# Patient Record
Sex: FEMALE | Race: WHITE | NOT HISPANIC OR LATINO | Employment: FULL TIME | URBAN - METROPOLITAN AREA
[De-identification: names, ages, dates, MRNs, and addresses within clinical notes are randomized per-mention and may not be internally consistent; named-entity substitution may affect disease eponyms.]

---

## 2019-04-04 ENCOUNTER — HOSPITAL ENCOUNTER (EMERGENCY)
Facility: MEDICAL CENTER | Age: 39
End: 2019-04-04
Attending: EMERGENCY MEDICINE
Payer: COMMERCIAL

## 2019-04-04 VITALS
SYSTOLIC BLOOD PRESSURE: 127 MMHG | DIASTOLIC BLOOD PRESSURE: 82 MMHG | TEMPERATURE: 98.9 F | RESPIRATION RATE: 18 BRPM | OXYGEN SATURATION: 98 % | WEIGHT: 127.87 LBS | HEART RATE: 103 BPM | BODY MASS INDEX: 19.44 KG/M2

## 2019-04-04 DIAGNOSIS — Z00.8 MEDICAL CLEARANCE FOR PSYCHIATRIC ADMISSION: ICD-10-CM

## 2019-04-04 DIAGNOSIS — S01.81XA FACIAL LACERATION, INITIAL ENCOUNTER: ICD-10-CM

## 2019-04-04 DIAGNOSIS — F10.20 ALCOHOLISM (HCC): ICD-10-CM

## 2019-04-04 LAB — POC BREATHALIZER: 0.05 PERCENT (ref 0–0.01)

## 2019-04-04 PROCEDURE — 302970 POC BREATHALIZER

## 2019-04-04 PROCEDURE — 90715 TDAP VACCINE 7 YRS/> IM: CPT

## 2019-04-04 PROCEDURE — 90471 IMMUNIZATION ADMIN: CPT

## 2019-04-04 PROCEDURE — 700111 HCHG RX REV CODE 636 W/ 250 OVERRIDE (IP)

## 2019-04-04 PROCEDURE — 700102 HCHG RX REV CODE 250 W/ 637 OVERRIDE(OP): Performed by: EMERGENCY MEDICINE

## 2019-04-04 PROCEDURE — 302970 POC BREATHALIZER: Performed by: EMERGENCY MEDICINE

## 2019-04-04 PROCEDURE — A9270 NON-COVERED ITEM OR SERVICE: HCPCS | Performed by: EMERGENCY MEDICINE

## 2019-04-04 PROCEDURE — 99284 EMERGENCY DEPT VISIT MOD MDM: CPT

## 2019-04-04 RX ORDER — LORAZEPAM 1 MG/1
1 TABLET ORAL ONCE
Status: COMPLETED | OUTPATIENT
Start: 2019-04-04 | End: 2019-04-04

## 2019-04-04 RX ADMIN — LORAZEPAM 1 MG: 1 TABLET ORAL at 22:12

## 2019-04-04 RX ADMIN — CLOSTRIDIUM TETANI TOXOID ANTIGEN (FORMALDEHYDE INACTIVATED), CORYNEBACTERIUM DIPHTHERIAE TOXOID ANTIGEN (FORMALDEHYDE INACTIVATED), BORDETELLA PERTUSSIS TOXOID ANTIGEN (GLUTARALDEHYDE INACTIVATED), BORDETELLA PERTUSSIS FILAMENTOUS HEMAGGLUTININ ANTIGEN (FORMALDEHYDE INACTIVATED), BORDETELLA PERTUSSIS PERTACTIN ANTIGEN, AND BORDETELLA PERTUSSIS FIMBRIAE 2/3 ANTIGEN 0.5 ML: 5; 2; 2.5; 5; 3; 5 INJECTION, SUSPENSION INTRAMUSCULAR at 22:13
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
## 2019-04-05 NOTE — ED NOTES
Pt had no questions or concerns .gait steady to lobby. Pt taking taxi to [facility], report given to Joana at [facility]

## 2019-05-06 ENCOUNTER — APPOINTMENT (OUTPATIENT)
Dept: RADIOLOGY | Facility: MEDICAL CENTER | Age: 39
End: 2019-05-06
Attending: EMERGENCY MEDICINE
Payer: COMMERCIAL

## 2019-05-06 ENCOUNTER — HOSPITAL ENCOUNTER (EMERGENCY)
Facility: MEDICAL CENTER | Age: 39
End: 2019-05-06
Attending: EMERGENCY MEDICINE
Payer: COMMERCIAL

## 2019-05-06 VITALS
BODY MASS INDEX: 18.38 KG/M2 | TEMPERATURE: 99.9 F | DIASTOLIC BLOOD PRESSURE: 86 MMHG | SYSTOLIC BLOOD PRESSURE: 122 MMHG | OXYGEN SATURATION: 95 % | HEIGHT: 68 IN | RESPIRATION RATE: 16 BRPM | HEART RATE: 103 BPM | WEIGHT: 121.25 LBS

## 2019-05-06 DIAGNOSIS — N39.0 ACUTE UTI: ICD-10-CM

## 2019-05-06 DIAGNOSIS — F19.10 POLYSUBSTANCE ABUSE (HCC): ICD-10-CM

## 2019-05-06 DIAGNOSIS — L03.119 ANKLE CELLULITIS: ICD-10-CM

## 2019-05-06 LAB
ALBUMIN SERPL BCP-MCNC: 3.4 G/DL (ref 3.2–4.9)
ALBUMIN/GLOB SERPL: 0.9 G/DL
ALP SERPL-CCNC: 112 U/L (ref 30–99)
ALT SERPL-CCNC: 30 U/L (ref 2–50)
AMPHET UR QL SCN: POSITIVE
ANION GAP SERPL CALC-SCNC: 11 MMOL/L (ref 0–11.9)
ANISOCYTOSIS BLD QL SMEAR: ABNORMAL
APPEARANCE UR: ABNORMAL
AST SERPL-CCNC: 46 U/L (ref 12–45)
BACTERIA #/AREA URNS HPF: ABNORMAL /HPF
BARBITURATES UR QL SCN: NEGATIVE
BASOPHILS # BLD AUTO: 0.9 % (ref 0–1.8)
BASOPHILS # BLD: 0.08 K/UL (ref 0–0.12)
BENZODIAZ UR QL SCN: POSITIVE
BILIRUB SERPL-MCNC: 0.5 MG/DL (ref 0.1–1.5)
BILIRUB UR QL STRIP.AUTO: NEGATIVE
BUN SERPL-MCNC: 8 MG/DL (ref 8–22)
BZE UR QL SCN: NEGATIVE
CALCIUM SERPL-MCNC: 9.4 MG/DL (ref 8.5–10.5)
CANNABINOIDS UR QL SCN: NEGATIVE
CHLORIDE SERPL-SCNC: 101 MMOL/L (ref 96–112)
CO2 SERPL-SCNC: 23 MMOL/L (ref 20–33)
COLOR UR: YELLOW
CREAT SERPL-MCNC: 0.69 MG/DL (ref 0.5–1.4)
CRP SERPL HS-MCNC: 12.04 MG/DL (ref 0–0.75)
EKG IMPRESSION: NORMAL
EOSINOPHIL # BLD AUTO: 0.08 K/UL (ref 0–0.51)
EOSINOPHIL NFR BLD: 0.9 % (ref 0–6.9)
EPI CELLS #/AREA URNS HPF: ABNORMAL /HPF
ERYTHROCYTE [DISTWIDTH] IN BLOOD BY AUTOMATED COUNT: 60.9 FL (ref 35.9–50)
ERYTHROCYTE [SEDIMENTATION RATE] IN BLOOD BY WESTERGREN METHOD: 70 MM/HOUR (ref 0–20)
ETHANOL BLD-MCNC: 0 G/DL
GIANT PLATELETS BLD QL SMEAR: NORMAL
GLOBULIN SER CALC-MCNC: 3.9 G/DL (ref 1.9–3.5)
GLUCOSE SERPL-MCNC: 84 MG/DL (ref 65–99)
GLUCOSE UR STRIP.AUTO-MCNC: NEGATIVE MG/DL
HCT VFR BLD AUTO: 41.3 % (ref 37–47)
HGB BLD-MCNC: 11.8 G/DL (ref 12–16)
KETONES UR STRIP.AUTO-MCNC: NEGATIVE MG/DL
LACTATE BLD-SCNC: 1.2 MMOL/L (ref 0.5–2)
LACTATE BLD-SCNC: 1.8 MMOL/L (ref 0.5–2)
LEUKOCYTE ESTERASE UR QL STRIP.AUTO: ABNORMAL
LYMPHOCYTES # BLD AUTO: 0.86 K/UL (ref 1–4.8)
LYMPHOCYTES NFR BLD: 9.9 % (ref 22–41)
MACROCYTES BLD QL SMEAR: ABNORMAL
MANUAL DIFF BLD: NORMAL
MCH RBC QN AUTO: 21 PG (ref 27–33)
MCHC RBC AUTO-ENTMCNC: 28.6 G/DL (ref 33.6–35)
MCV RBC AUTO: 73.4 FL (ref 81.4–97.8)
METHADONE UR QL SCN: NEGATIVE
MICRO URNS: ABNORMAL
MICROCYTES BLD QL SMEAR: ABNORMAL
MONOCYTES # BLD AUTO: 0.08 K/UL (ref 0–0.85)
MONOCYTES NFR BLD AUTO: 0.9 % (ref 0–13.4)
MORPHOLOGY BLD-IMP: NORMAL
MYELOCYTES NFR BLD MANUAL: 0.9 %
NEUTROPHILS # BLD AUTO: 7.53 K/UL (ref 2–7.15)
NEUTROPHILS NFR BLD: 84.7 % (ref 44–72)
NEUTS BAND NFR BLD MANUAL: 1.8 % (ref 0–10)
NITRITE UR QL STRIP.AUTO: POSITIVE
NRBC # BLD AUTO: 0 K/UL
NRBC BLD-RTO: 0 /100 WBC
OPIATES UR QL SCN: POSITIVE
OVALOCYTES BLD QL SMEAR: NORMAL
OXYCODONE UR QL SCN: NEGATIVE
PCP UR QL SCN: NEGATIVE
PH UR STRIP.AUTO: 7 [PH]
PLATELET # BLD AUTO: 300 K/UL (ref 164–446)
PLATELET BLD QL SMEAR: NORMAL
PMV BLD AUTO: 8.8 FL (ref 9–12.9)
POIKILOCYTOSIS BLD QL SMEAR: NORMAL
POTASSIUM SERPL-SCNC: 3.4 MMOL/L (ref 3.6–5.5)
PROPOXYPH UR QL SCN: NEGATIVE
PROT SERPL-MCNC: 7.3 G/DL (ref 6–8.2)
PROT UR QL STRIP: NEGATIVE MG/DL
RBC # BLD AUTO: 5.63 M/UL (ref 4.2–5.4)
RBC # URNS HPF: ABNORMAL /HPF
RBC BLD AUTO: PRESENT
RBC UR QL AUTO: ABNORMAL
SCHISTOCYTES BLD QL SMEAR: NORMAL
SMUDGE CELLS BLD QL SMEAR: NORMAL
SODIUM SERPL-SCNC: 135 MMOL/L (ref 135–145)
SP GR UR STRIP.AUTO: 1.01
UROBILINOGEN UR STRIP.AUTO-MCNC: 1 MG/DL
WBC # BLD AUTO: 8.7 K/UL (ref 4.8–10.8)
WBC #/AREA URNS HPF: ABNORMAL /HPF

## 2019-05-06 PROCEDURE — 85652 RBC SED RATE AUTOMATED: CPT

## 2019-05-06 PROCEDURE — 87040 BLOOD CULTURE FOR BACTERIA: CPT | Mod: 91

## 2019-05-06 PROCEDURE — 700111 HCHG RX REV CODE 636 W/ 250 OVERRIDE (IP): Performed by: EMERGENCY MEDICINE

## 2019-05-06 PROCEDURE — 93005 ELECTROCARDIOGRAM TRACING: CPT | Performed by: EMERGENCY MEDICINE

## 2019-05-06 PROCEDURE — 80053 COMPREHEN METABOLIC PANEL: CPT

## 2019-05-06 PROCEDURE — 85027 COMPLETE CBC AUTOMATED: CPT

## 2019-05-06 PROCEDURE — 85007 BL SMEAR W/DIFF WBC COUNT: CPT

## 2019-05-06 PROCEDURE — 70450 CT HEAD/BRAIN W/O DYE: CPT

## 2019-05-06 PROCEDURE — 71045 X-RAY EXAM CHEST 1 VIEW: CPT

## 2019-05-06 PROCEDURE — 700105 HCHG RX REV CODE 258: Performed by: EMERGENCY MEDICINE

## 2019-05-06 PROCEDURE — 73610 X-RAY EXAM OF ANKLE: CPT | Mod: LT

## 2019-05-06 PROCEDURE — 87086 URINE CULTURE/COLONY COUNT: CPT

## 2019-05-06 PROCEDURE — 80307 DRUG TEST PRSMV CHEM ANLYZR: CPT

## 2019-05-06 PROCEDURE — 36415 COLL VENOUS BLD VENIPUNCTURE: CPT

## 2019-05-06 PROCEDURE — 87186 SC STD MICRODIL/AGAR DIL: CPT

## 2019-05-06 PROCEDURE — 99285 EMERGENCY DEPT VISIT HI MDM: CPT

## 2019-05-06 PROCEDURE — 86140 C-REACTIVE PROTEIN: CPT

## 2019-05-06 PROCEDURE — 87077 CULTURE AEROBIC IDENTIFY: CPT

## 2019-05-06 PROCEDURE — 83605 ASSAY OF LACTIC ACID: CPT

## 2019-05-06 PROCEDURE — 81001 URINALYSIS AUTO W/SCOPE: CPT | Mod: XU

## 2019-05-06 PROCEDURE — 96365 THER/PROPH/DIAG IV INF INIT: CPT

## 2019-05-06 RX ORDER — DIPHENHYDRAMINE HYDROCHLORIDE 50 MG/ML
25 INJECTION INTRAMUSCULAR; INTRAVENOUS ONCE
Status: DISCONTINUED | OUTPATIENT
Start: 2019-05-06 | End: 2019-05-06

## 2019-05-06 RX ORDER — SODIUM CHLORIDE 9 MG/ML
30 INJECTION, SOLUTION INTRAVENOUS ONCE
Status: COMPLETED | OUTPATIENT
Start: 2019-05-06 | End: 2019-05-06

## 2019-05-06 RX ORDER — SULFAMETHOXAZOLE AND TRIMETHOPRIM 800; 160 MG/1; MG/1
1 TABLET ORAL 2 TIMES DAILY
Qty: 20 TAB | Refills: 0 | Status: SHIPPED | OUTPATIENT
Start: 2019-05-06 | End: 2019-05-16

## 2019-05-06 RX ORDER — SODIUM CHLORIDE 9 MG/ML
1000 INJECTION, SOLUTION INTRAVENOUS
Status: DISCONTINUED | OUTPATIENT
Start: 2019-05-06 | End: 2019-05-07 | Stop reason: HOSPADM

## 2019-05-06 RX ORDER — CEPHALEXIN 500 MG/1
500 CAPSULE ORAL 3 TIMES DAILY
Qty: 21 CAP | Refills: 0 | Status: SHIPPED | OUTPATIENT
Start: 2019-05-06 | End: 2019-05-13

## 2019-05-06 RX ADMIN — AMPICILLIN AND SULBACTAM 3 G: 2; 1 INJECTION, POWDER, FOR SOLUTION INTRAVENOUS at 20:39

## 2019-05-06 RX ADMIN — SODIUM CHLORIDE 1650 ML: 9 INJECTION, SOLUTION INTRAVENOUS at 20:15

## 2019-05-06 NOTE — LETTER
5/8/2019               Leah Kulwinder Carlos  [address] [address]        Dear Leah (MR#[id number])    As we have been unable to contact you by phone, this letter is sent in regards to your recent visit to the [facility] Emergency Department on 5/6/2019.  During the visit, tests were performed to assist the physician in a medical diagnosis.  A review of those tests requires that we notify you of the following:    Your urine culture and sensitivity that was POSITIVE for a bacteria called Escherichia coli, and further treatment may be necessary. The currently prescribed antibiotic (sulfamethoxazole-trimethoprim and cephalexin) may not be effective in treating your infection. IF YOU ARE NOT FEELING BETTER PLEASE CONTACT ME AS SOON AS POSSIBLE AT THE NUMBER BELOW.       Because of the above findings, we advise that you see your private physician or you may return to the Emergency Department for additional treatment.      Please feel free to contact me at the number below if you have any questions or concerns. Thank you for your cooperation in the matter.    Sincerely,  ED Culture Follow-Up Staff  Veronica Handy, PharmD    [facility], Emergency Department  [address], [place] [address]  [phone]

## 2019-05-07 NOTE — ED NOTES
IV placed, attempted blood draw, able to get one green top and first set of blood cultures,  called to draw second set and remaining labs.
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
## 2019-05-07 NOTE — DISCHARGE PLANNING
Medical Social Work    Referral: Return to [facility] Voluntarily    Intervention: MSW received a call from bedside RN that pt is from [facility] for medical clearance and is ready to return.  MSW contacted Geoffrey at [facility] who verified pt is there for detox voluntarily.  Per Geoffrey they do have the two antibiotics that pt was prescribed at their pharmacy.  Per Geoffrey pt can return to them; receiving doctor is Dr. Gonzalez.  MSW provided bedside RN with cab voucher (# [phone]) for pt to get back to [facility] safely.    Plan: Pt to D/C back to [facility] via cab.

## 2019-05-07 NOTE — ED NOTES
Attempted to straight cath patient per order, patient immediately woke up and was a0x4, and ambulatory to the bathroom with steady gait

## 2019-05-07 NOTE — ED TRIAGE NOTES
"Pt bib ems.  Chief Complaint   Patient presents with   • Detox     sent from [facility]   • Ankle Pain     left     Pt states she wants to detox, checked into [facility] this am. \"They sent me here for my ankle. They still have all my stuff!\"   "

## 2019-05-08 LAB
BACTERIA UR CULT: ABNORMAL
BACTERIA UR CULT: ABNORMAL
SIGNIFICANT IND 70042: ABNORMAL
SITE SITE: ABNORMAL
SOURCE SOURCE: ABNORMAL

## 2019-05-12 LAB
BACTERIA BLD CULT: NORMAL
BACTERIA BLD CULT: NORMAL
SIGNIFICANT IND 70042: NORMAL
SIGNIFICANT IND 70042: NORMAL
SITE SITE: NORMAL
SITE SITE: NORMAL
SOURCE SOURCE: NORMAL
SOURCE SOURCE: NORMAL

## 2020-02-14 ENCOUNTER — HOSPITAL ENCOUNTER (EMERGENCY)
Dept: HOSPITAL 8 - ED | Age: 40
LOS: 1 days | Discharge: HOME | End: 2020-02-15
Payer: MEDICAID

## 2020-02-14 VITALS — HEIGHT: 68 IN | BODY MASS INDEX: 20.11 KG/M2 | WEIGHT: 132.72 LBS

## 2020-02-14 DIAGNOSIS — F17.200: ICD-10-CM

## 2020-02-14 DIAGNOSIS — N20.1: Primary | ICD-10-CM

## 2020-02-14 DIAGNOSIS — N23: ICD-10-CM

## 2020-02-14 DIAGNOSIS — G40.909: ICD-10-CM

## 2020-02-14 LAB
<PLATELET ESTIMATE>: ADEQUATE
<PLT MORPHOLOGY>: (no result)
ALBUMIN SERPL-MCNC: 3.7 G/DL (ref 3.4–5)
ALP SERPL-CCNC: 57 U/L (ref 45–117)
ALT SERPL-CCNC: 17 U/L (ref 12–78)
ANION GAP SERPL CALC-SCNC: 8 MMOL/L (ref 5–15)
ANISOCYTOSIS BLD QL SMEAR: (no result)
BASOPHILS # BLD AUTO: 0.01 X10^3/UL (ref 0–0.1)
BASOPHILS NFR BLD AUTO: 0 % (ref 0–1)
BILIRUB SERPL-MCNC: 0.3 MG/DL (ref 0.2–1)
CALCIUM SERPL-MCNC: 8.6 MG/DL (ref 8.5–10.1)
CHLORIDE SERPL-SCNC: 108 MMOL/L (ref 98–107)
CREAT SERPL-MCNC: 1.06 MG/DL (ref 0.55–1.02)
CULTURE INDICATED?: NO
EOSINOPHIL # BLD AUTO: 0.07 X10^3/UL (ref 0–0.4)
EOSINOPHIL NFR BLD AUTO: 1 % (ref 1–7)
ERYTHROCYTE [DISTWIDTH] IN BLOOD BY AUTOMATED COUNT: 19.6 % (ref 9.6–15.2)
HYPOCHROMIA BLD QL SMEAR: (no result)
LYMPHOCYTES # BLD AUTO: 1.08 X10^3/UL (ref 1–3.4)
LYMPHOCYTES NFR BLD AUTO: 19 % (ref 22–44)
MCH RBC QN AUTO: 20.5 PG (ref 27–34.8)
MCHC RBC AUTO-ENTMCNC: 30.2 G/DL (ref 32.4–35.8)
MCV RBC AUTO: 67.9 FL (ref 80–100)
MD: (no result)
MICROCYTES BLD QL SMEAR: (no result)
MICROSCOPIC: (no result)
MONOCYTES # BLD AUTO: 0.65 X10^3/UL (ref 0.2–0.8)
MONOCYTES NFR BLD AUTO: 11 % (ref 2–9)
NEUTROPHILS # BLD AUTO: 4 X10^3/UL (ref 1.8–6.8)
NEUTROPHILS NFR BLD AUTO: 69 % (ref 42–75)
OVALOCYTES BLD QL SMEAR: (no result)
PLATELET # BLD AUTO: 347 X10^3/UL (ref 130–400)
PMV BLD AUTO: 7.7 FL (ref 7.4–10.4)
PROT SERPL-MCNC: 7.3 G/DL (ref 6.4–8.2)
RBC # BLD AUTO: 4.93 X10^6/UL (ref 3.82–5.3)

## 2020-02-14 PROCEDURE — 74176 CT ABD & PELVIS W/O CONTRAST: CPT

## 2020-02-14 PROCEDURE — 99284 EMERGENCY DEPT VISIT MOD MDM: CPT

## 2020-02-14 PROCEDURE — 96374 THER/PROPH/DIAG INJ IV PUSH: CPT

## 2020-02-14 PROCEDURE — 83690 ASSAY OF LIPASE: CPT

## 2020-02-14 PROCEDURE — 81001 URINALYSIS AUTO W/SCOPE: CPT

## 2020-02-14 PROCEDURE — 96375 TX/PRO/DX INJ NEW DRUG ADDON: CPT

## 2020-02-14 PROCEDURE — 80053 COMPREHEN METABOLIC PANEL: CPT

## 2020-02-14 PROCEDURE — 85025 COMPLETE CBC W/AUTO DIFF WBC: CPT

## 2020-02-14 PROCEDURE — 36415 COLL VENOUS BLD VENIPUNCTURE: CPT

## 2020-02-14 PROCEDURE — 84703 CHORIONIC GONADOTROPIN ASSAY: CPT

## 2020-02-14 NOTE — NUR
Pt returned from CT. VS retaken. Pt on pulse ox/HR monitor. Call light within 
reach. Warm blanket provided.

## 2020-02-14 NOTE — NUR
Pt alert and sitting up on gurney. Pt reports left sided back pain, nausea and 
vomiting since noon. No fevers or diarrhea. Pt reprots hx of kidney stones. MD 
at bedside. Lab at bedside for draw. Pt in gown. Call light within reach.

## 2020-02-15 VITALS — DIASTOLIC BLOOD PRESSURE: 74 MMHG | SYSTOLIC BLOOD PRESSURE: 124 MMHG

## 2020-02-15 NOTE — NUR
Pt alert sitting up on gurney. Pt reports improvement after med admin. VS 
retaken. Pt aware of wait for CT results. No needs at this time.

## 2020-02-23 ENCOUNTER — HOSPITAL ENCOUNTER (EMERGENCY)
Dept: HOSPITAL 8 - ED | Age: 40
Discharge: HOME | End: 2020-02-23
Payer: MEDICAID

## 2020-02-23 VITALS — HEIGHT: 68 IN | BODY MASS INDEX: 21.15 KG/M2 | WEIGHT: 139.55 LBS

## 2020-02-23 VITALS — SYSTOLIC BLOOD PRESSURE: 111 MMHG | DIASTOLIC BLOOD PRESSURE: 71 MMHG

## 2020-02-23 DIAGNOSIS — N30.00: Primary | ICD-10-CM

## 2020-02-23 LAB
ALBUMIN SERPL-MCNC: 3.6 G/DL (ref 3.4–5)
ALP SERPL-CCNC: 54 U/L (ref 45–117)
ALT SERPL-CCNC: 20 U/L (ref 12–78)
ANION GAP SERPL CALC-SCNC: 5 MMOL/L (ref 5–15)
BASOPHILS # BLD AUTO: 0.03 X10^3/UL (ref 0–0.1)
BASOPHILS NFR BLD AUTO: 0 % (ref 0–1)
BILIRUB SERPL-MCNC: 0.3 MG/DL (ref 0.2–1)
CALCIUM SERPL-MCNC: 7.8 MG/DL (ref 8.5–10.1)
CHLORIDE SERPL-SCNC: 110 MMOL/L (ref 98–107)
CREAT SERPL-MCNC: 0.87 MG/DL (ref 0.55–1.02)
CULTURE INDICATED?: YES
EOSINOPHIL # BLD AUTO: 0.24 X10^3/UL (ref 0–0.4)
EOSINOPHIL NFR BLD AUTO: 3 % (ref 1–7)
ERYTHROCYTE [DISTWIDTH] IN BLOOD BY AUTOMATED COUNT: 19.1 % (ref 9.6–15.2)
LYMPHOCYTES # BLD AUTO: 1.47 X10^3/UL (ref 1–3.4)
LYMPHOCYTES NFR BLD AUTO: 15 % (ref 22–44)
MCH RBC QN AUTO: 20.8 PG (ref 27–34.8)
MCHC RBC AUTO-ENTMCNC: 31 G/DL (ref 32.4–35.8)
MCV RBC AUTO: 67 FL (ref 80–100)
MD: NO
MICROSCOPIC: (no result)
MONOCYTES # BLD AUTO: 0.71 X10^3/UL (ref 0.2–0.8)
MONOCYTES NFR BLD AUTO: 7 % (ref 2–9)
NEUTROPHILS # BLD AUTO: 7.26 X10^3/UL (ref 1.8–6.8)
NEUTROPHILS NFR BLD AUTO: 75 % (ref 42–75)
PLATELET # BLD AUTO: 486 X10^3/UL (ref 130–400)
PMV BLD AUTO: 7.3 FL (ref 7.4–10.4)
PROT SERPL-MCNC: 7 G/DL (ref 6.4–8.2)
RBC # BLD AUTO: 5.12 X10^6/UL (ref 3.82–5.3)

## 2020-02-23 PROCEDURE — 96374 THER/PROPH/DIAG INJ IV PUSH: CPT

## 2020-02-23 PROCEDURE — 96375 TX/PRO/DX INJ NEW DRUG ADDON: CPT

## 2020-02-23 PROCEDURE — 85025 COMPLETE CBC W/AUTO DIFF WBC: CPT

## 2020-02-23 PROCEDURE — 81001 URINALYSIS AUTO W/SCOPE: CPT

## 2020-02-23 PROCEDURE — 83690 ASSAY OF LIPASE: CPT

## 2020-02-23 PROCEDURE — 87086 URINE CULTURE/COLONY COUNT: CPT

## 2020-02-23 PROCEDURE — 80053 COMPREHEN METABOLIC PANEL: CPT

## 2020-02-23 PROCEDURE — 76770 US EXAM ABDO BACK WALL COMP: CPT

## 2020-02-23 PROCEDURE — 99284 EMERGENCY DEPT VISIT MOD MDM: CPT

## 2020-02-23 PROCEDURE — 84703 CHORIONIC GONADOTROPIN ASSAY: CPT

## 2020-02-23 PROCEDURE — 87147 CULTURE TYPE IMMUNOLOGIC: CPT

## 2020-02-23 PROCEDURE — 36415 COLL VENOUS BLD VENIPUNCTURE: CPT

## 2020-02-23 NOTE — NUR
PT PRESENTED WITH CO KIDNEY STONES FOR 9 DAYS, "I WAS HERE 9 DAYS AGO, THE PAIN 
HAS GOTTEN WORSE", B/L LOWER ABD AND FLANK PAIN, + NAUSEA. MONITORS APPLIED, 
PROVIDED PT WITH WARM BLNKET, SIDERAILS UP X2, CALL LIGHT WITHIN REACH. URINE 
SAMPLE SENT TO LAB. AWAITING ERP EVAL AND ORDERS

## 2020-07-24 PROBLEM — F31.9 BIPOLAR AFFECTIVE (HCC): Status: ACTIVE | Noted: 2020-07-24

## 2020-09-20 PROBLEM — K92.1 MELENA: Status: ACTIVE | Noted: 2020-09-20

## 2020-09-20 PROBLEM — K85.20 ALCOHOL-INDUCED ACUTE PANCREATITIS WITHOUT INFECTION OR NECROSIS: Status: ACTIVE | Noted: 2020-09-20

## 2020-09-20 PROBLEM — Z51.81 ENCOUNTER FOR MONITORING SUBOXONE MAINTENANCE THERAPY: Status: ACTIVE | Noted: 2020-09-20

## 2020-09-20 PROBLEM — F10.930 ALCOHOL WITHDRAWAL SYNDROME WITHOUT COMPLICATION (HCC): Status: ACTIVE | Noted: 2020-09-20

## 2020-09-20 PROBLEM — H20.9 TRAUMATIC IRITIS: Status: ACTIVE | Noted: 2020-09-20

## 2020-09-20 PROBLEM — Z79.899 ENCOUNTER FOR MONITORING SUBOXONE MAINTENANCE THERAPY: Status: ACTIVE | Noted: 2020-09-20

## 2020-09-21 PROBLEM — R94.31 ABNORMAL EKG: Status: ACTIVE | Noted: 2020-09-21

## 2020-09-24 PROBLEM — K92.1 MELENA: Status: RESOLVED | Noted: 2020-09-20 | Resolved: 2020-09-24

## 2020-09-24 PROBLEM — K85.20 ALCOHOL-INDUCED ACUTE PANCREATITIS WITHOUT INFECTION OR NECROSIS: Status: RESOLVED | Noted: 2020-09-20 | Resolved: 2020-09-24

## 2020-09-24 PROBLEM — R94.31 ABNORMAL EKG: Status: RESOLVED | Noted: 2020-09-21 | Resolved: 2020-09-24

## 2020-09-24 PROBLEM — F10.930 ALCOHOL WITHDRAWAL SYNDROME WITHOUT COMPLICATION (HCC): Status: RESOLVED | Noted: 2020-09-20 | Resolved: 2020-09-24

## 2020-09-25 PROBLEM — F11.90 OPIOID USE DISORDER: Status: ACTIVE | Noted: 2020-09-25

## 2020-09-28 PROBLEM — K70.10 ALCOHOLIC HEPATITIS: Status: ACTIVE | Noted: 2020-09-28

## 2020-09-28 PROBLEM — T74.91XA ADULT ABUSE, DOMESTIC: Status: ACTIVE | Noted: 2020-09-28

## 2020-09-28 PROBLEM — Z51.81 ENCOUNTER FOR MONITORING SUBOXONE MAINTENANCE THERAPY: Status: RESOLVED | Noted: 2020-09-20 | Resolved: 2020-09-28

## 2020-09-28 PROBLEM — Z79.899 ENCOUNTER FOR MONITORING SUBOXONE MAINTENANCE THERAPY: Status: RESOLVED | Noted: 2020-09-20 | Resolved: 2020-09-28

## 2020-09-28 PROBLEM — R82.998 LEUKOCYTES IN URINE: Status: ACTIVE | Noted: 2020-09-28

## 2020-10-05 PROBLEM — R19.5 DARK STOOLS: Status: ACTIVE | Noted: 2020-10-05

## 2020-10-05 PROBLEM — R19.5 DARK STOOLS: Status: RESOLVED | Noted: 2020-10-05 | Resolved: 2020-10-05

## 2020-10-13 PROBLEM — D64.9 ANEMIA: Status: ACTIVE | Noted: 2020-10-13

## 2020-10-13 PROBLEM — D64.9 ANEMIA: Status: RESOLVED | Noted: 2020-10-13 | Resolved: 2020-10-13

## 2020-11-03 PROBLEM — Z87.19 HX OF ACUTE ALCOHOLIC HEPATITIS: Status: ACTIVE | Noted: 2020-11-03

## 2020-11-03 PROBLEM — H20.9 IRITIS: Status: RESOLVED | Noted: 2020-09-20 | Resolved: 2020-11-03

## 2020-11-03 PROBLEM — Z00.00 HEALTH CARE MAINTENANCE: Status: ACTIVE | Noted: 2020-11-03

## 2020-11-03 PROBLEM — F10.930 ALCOHOL WITHDRAWAL SYNDROME WITHOUT COMPLICATION (HCC): Status: RESOLVED | Noted: 2020-09-20 | Resolved: 2020-11-03

## 2020-11-03 PROBLEM — K70.10 ALCOHOLIC HEPATITIS: Status: RESOLVED | Noted: 2020-09-28 | Resolved: 2020-11-03

## 2020-11-09 PROBLEM — F41.9 ANXIETY: Status: ACTIVE | Noted: 2020-11-09

## 2020-12-01 PROBLEM — M54.16 LUMBAR RADICULOPATHY: Status: ACTIVE | Noted: 2020-12-01

## 2020-12-16 PROBLEM — L98.9 SKIN LESION OF FACE: Status: ACTIVE | Noted: 2020-12-16

## 2021-01-27 PROBLEM — F31.76 BIPOLAR 1 DISORDER, DEPRESSED, FULL REMISSION (HCC): Status: ACTIVE | Noted: 2021-01-27

## 2021-04-27 PROBLEM — F31.9 BIPOLAR AFFECTIVE (HCC): Status: RESOLVED | Noted: 2020-07-24 | Resolved: 2021-04-27

## 2021-05-27 PROBLEM — S61.219A CUT OF FINGER: Status: ACTIVE | Noted: 2021-05-27

## 2021-05-27 PROBLEM — R11.0 NAUSEA: Status: ACTIVE | Noted: 2021-05-27

## 2021-11-08 PROBLEM — G89.29 CHRONIC BILATERAL LOW BACK PAIN WITH BILATERAL SCIATICA: Status: ACTIVE | Noted: 2021-11-08

## 2021-11-08 PROBLEM — M54.41 CHRONIC BILATERAL LOW BACK PAIN WITH BILATERAL SCIATICA: Status: ACTIVE | Noted: 2021-11-08

## 2021-11-08 PROBLEM — M54.42 CHRONIC BILATERAL LOW BACK PAIN WITH BILATERAL SCIATICA: Status: ACTIVE | Noted: 2021-11-08

## 2021-12-06 PROBLEM — F33.1 MODERATE EPISODE OF RECURRENT MAJOR DEPRESSIVE DISORDER (HCC): Status: ACTIVE | Noted: 2021-12-06

## 2021-12-28 PROBLEM — F10.10 ALCOHOL ABUSE: Status: ACTIVE | Noted: 2021-12-28

## 2021-12-28 PROBLEM — F10.10 ALCOHOL ABUSE: Status: RESOLVED | Noted: 2021-12-28 | Resolved: 2021-12-28

## 2022-02-19 PROBLEM — Z87.19 HX OF ACUTE ALCOHOLIC HEPATITIS: Status: RESOLVED | Noted: 2020-11-03 | Resolved: 2022-02-19

## 2022-02-19 PROBLEM — L98.9 SKIN LESION OF FACE: Status: RESOLVED | Noted: 2020-12-16 | Resolved: 2022-02-19

## 2022-02-19 PROBLEM — Z00.00 HEALTH CARE MAINTENANCE: Status: RESOLVED | Noted: 2020-11-03 | Resolved: 2022-02-19

## 2022-02-19 PROBLEM — G56.31: Status: ACTIVE | Noted: 2022-02-19

## 2022-02-19 PROBLEM — R11.0 NAUSEA: Status: RESOLVED | Noted: 2021-05-27 | Resolved: 2022-02-19

## 2022-02-19 PROBLEM — S61.219A CUT OF FINGER: Status: RESOLVED | Noted: 2021-05-27 | Resolved: 2022-02-19

## 2022-02-19 PROBLEM — R82.998 LEUKOCYTES IN URINE: Status: RESOLVED | Noted: 2020-09-28 | Resolved: 2022-02-19

## 2022-02-19 PROBLEM — F33.1 MODERATE EPISODE OF RECURRENT MAJOR DEPRESSIVE DISORDER (HCC): Status: RESOLVED | Noted: 2021-12-06 | Resolved: 2022-02-19

## 2022-04-05 PROBLEM — G56.31: Status: RESOLVED | Noted: 2022-02-19 | Resolved: 2022-04-05
